# Patient Record
Sex: MALE | Race: WHITE | NOT HISPANIC OR LATINO | Employment: FULL TIME | URBAN - METROPOLITAN AREA
[De-identification: names, ages, dates, MRNs, and addresses within clinical notes are randomized per-mention and may not be internally consistent; named-entity substitution may affect disease eponyms.]

---

## 2021-01-15 ENCOUNTER — PATIENT MESSAGE (OUTPATIENT)
Dept: HEMATOLOGY/ONCOLOGY | Facility: CLINIC | Age: 61
End: 2021-01-15

## 2021-01-15 ENCOUNTER — OFFICE VISIT (OUTPATIENT)
Dept: HEMATOLOGY/ONCOLOGY | Facility: CLINIC | Age: 61
End: 2021-01-15
Payer: COMMERCIAL

## 2021-01-15 DIAGNOSIS — Z71.83 ENCOUNTER FOR NONPROCREATIVE GENETIC COUNSELING: Primary | ICD-10-CM

## 2021-01-15 PROCEDURE — 99205 OFFICE O/P NEW HI 60 MIN: CPT | Mod: ,,, | Performed by: NURSE PRACTITIONER

## 2021-01-15 PROCEDURE — 99205 PR OFFICE/OUTPT VISIT, NEW, LEVL V, 60-74 MIN: ICD-10-PCS | Mod: ,,, | Performed by: NURSE PRACTITIONER

## 2021-02-19 ENCOUNTER — PATIENT MESSAGE (OUTPATIENT)
Dept: HEMATOLOGY/ONCOLOGY | Facility: CLINIC | Age: 61
End: 2021-02-19

## 2021-02-19 DIAGNOSIS — Z80.9 FAMILY HISTORY OF CANCER: ICD-10-CM

## 2021-02-19 DIAGNOSIS — Z80.42 FAMILY HISTORY OF PROSTATE CANCER: Primary | ICD-10-CM

## 2021-04-20 ENCOUNTER — DOCUMENTATION ONLY (OUTPATIENT)
Dept: HEMATOLOGY/ONCOLOGY | Facility: CLINIC | Age: 61
End: 2021-04-20

## 2021-04-22 ENCOUNTER — TELEPHONE (OUTPATIENT)
Dept: SURGERY | Facility: CLINIC | Age: 61
End: 2021-04-22

## 2021-07-01 ENCOUNTER — PATIENT MESSAGE (OUTPATIENT)
Dept: ADMINISTRATIVE | Facility: OTHER | Age: 61
End: 2021-07-01

## 2021-07-28 ENCOUNTER — PATIENT MESSAGE (OUTPATIENT)
Dept: HEMATOLOGY/ONCOLOGY | Facility: CLINIC | Age: 61
End: 2021-07-28

## 2021-07-28 ENCOUNTER — OFFICE VISIT (OUTPATIENT)
Dept: HEMATOLOGY/ONCOLOGY | Facility: CLINIC | Age: 61
End: 2021-07-28
Payer: COMMERCIAL

## 2021-07-28 DIAGNOSIS — Z71.83 ENCOUNTER FOR NONPROCREATIVE GENETIC COUNSELING: Primary | ICD-10-CM

## 2021-07-28 PROCEDURE — 99215 OFFICE O/P EST HI 40 MIN: CPT | Mod: 95,,, | Performed by: NURSE PRACTITIONER

## 2021-07-28 PROCEDURE — 99215 PR OFFICE/OUTPT VISIT, EST, LEVL V, 40-54 MIN: ICD-10-PCS | Mod: 95,,, | Performed by: NURSE PRACTITIONER

## 2021-08-16 ENCOUNTER — TELEPHONE (OUTPATIENT)
Dept: HEMATOLOGY/ONCOLOGY | Facility: CLINIC | Age: 61
End: 2021-08-16

## 2021-08-23 ENCOUNTER — TELEPHONE (OUTPATIENT)
Dept: HEMATOLOGY/ONCOLOGY | Facility: CLINIC | Age: 61
End: 2021-08-23

## 2021-09-30 ENCOUNTER — TELEPHONE (OUTPATIENT)
Dept: HEMATOLOGY/ONCOLOGY | Facility: CLINIC | Age: 61
End: 2021-09-30

## 2021-10-29 PROBLEM — Z12.11 SCREENING FOR MALIGNANT NEOPLASM OF COLON: Status: ACTIVE | Noted: 2021-10-29

## 2022-02-15 ENCOUNTER — DOCUMENTATION ONLY (OUTPATIENT)
Dept: HEMATOLOGY/ONCOLOGY | Facility: CLINIC | Age: 62
End: 2022-02-15
Payer: COMMERCIAL

## 2022-02-15 NOTE — Clinical Note
Sis,  Please phone patient with the following: --Inform patient of AMENDED genetic testing results as detailed in attached note; and --Inform patient that a results letter has been sent to patient via [facility].  2.   Please document your call and CC referring provider and me.  3.   Lastly, please ensure that the complete AMENDED results report is scanned into chart in Epic.  Thank you, Navid

## 2022-02-15 NOTE — PROGRESS NOTES
"Hereditary and High-Risk Clinic  Department of Hematology and Oncology  [facility]      Germline Testing for Hereditary Cancer Susceptibility  Note Regarding AMENDED Results      Patient Identification  Name: Timur Vivas ("Timur")  : 1960  MRN: [id number]             Notified Timur of his amended germline cancer genetic testing results via a letter sent through [employer] ([facility]).     Genetics Navigator is to phone Timur with his amended results and with notification that the above letter has been sent and is to also ensure that the complete results report is in Timur's chart in Epic.    Signed,    Navid Dowling, DNP, APRN, FNP-BC, AOCNP  Hereditary and High-Risk Clinic  Department of Hematology and Oncology  [facility]            "

## 2022-02-15 NOTE — LETTER
February 15, 2022    Timur Vivas  [address] [place] [id number]             Dear Timur,      In April of 2021, you underwent germline cancer genetic testing after meeting with me, Navid Dowling NP, with the [facility].  Below is important information pertaining to your genetic testing results.  Please read the letter at your earliest convenience, and reach out to me with any questions or concerns.       If you would like to have an in-person or a virtual appointment for post-test genetic counseling, please message me through your [facility] patient portal or call me at [phone] to schedule the appointment.     Original Cancer Genetic Testing Results     The test that you underwent was a customized hereditary cancer panel through [employer].    Among the 108 genes that were tested, 4 genetic variants were reported:  1. 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 APC c.1333C>G (p.Sfe778Xgv) heterozygous Uncertain Significance (VUS)  3. FAVIAN c.9008A>G (p.Mdx9980Oer) heterozygous Uncertain Significance (VUS)  4. ENG c.7C>T (p.Uqi6Zhs) heterozygous Uncertain Significance (VUS)     In a gene, a VUS represents a change that the genetic testing company has not seen enough times to make a determination as to whether it is benign (harmless) or pathogenic (harmful) at this time.  VUSs are quite common and are not typically acted upon clinically.     UPDATED/AMENDED Cancer Genetic Testing Results     [employer] has recently issued a new report from your same genetic testing.       This new report indicates that [employer] has changed its classification of your abovementioned ENG gene variant (#4 above) from a VUS to a likely benign variant.  [employer] commented that this change in variant classification was made as a result of re-review of the evidence in light of new variant interpretation guidelines and/or new information.        The other 3 genetic variants mentioned above remain classified as indicated above; only your ENG variant's classification has changed.  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     A copy of your genetic testing results will be in your [facility] medical record.  Your genetic testing results report has been released to you and is accessible by you through your [facility] patient portal; if you have any difficulty accessing this, please let our office know so we can mail you a hard copy.     My recommendations remain unchanged in light of this reclassification.       Please don't hesitate to reach out with any questions or concerns.     Sincerely,           Navid Dowling, STEVIE, APRN, FNP-BC, AOCNP  Nurse Practitioner, Hereditary and High-Risk Clinic  Department of Hematology and Oncology  [facility]    Phone:  [phone]

## 2022-02-18 ENCOUNTER — TELEPHONE (OUTPATIENT)
Dept: HEMATOLOGY/ONCOLOGY | Facility: CLINIC | Age: 62
End: 2022-02-18
Payer: COMMERCIAL

## 2022-02-18 NOTE — TELEPHONE ENCOUNTER
Called and reviewed the update with Timur. He voiced thanks and understanding. Separate patient update message sent to Navid.    ----- Message from Navid Dowling DNP sent at 2/15/2022 10:09 AM CST -----    Sis,    Please phone patient with the following:  --Inform patient of AMENDED genetic testing results as detailed in attached note; and  --Inform patient that a results letter has been sent to patient via [facility].    2.   Please document your call and CC referring provider and me.    3.   Lastly, please ensure that the complete AMENDED results report is scanned into chart in Epic.    Thank you,  Navid

## 2022-02-24 ENCOUNTER — TELEPHONE (OUTPATIENT)
Dept: HEMATOLOGY/ONCOLOGY | Facility: CLINIC | Age: 62
End: 2022-02-24
Payer: COMMERCIAL

## 2022-02-24 NOTE — TELEPHONE ENCOUNTER
Received notice from genetics navigator regarding pt advising her of his wife's passing when genetics navigator phoned pt.  Phoned pt to express my condolences and to offer support resources; however, he did not answer.

## 2022-05-10 ENCOUNTER — TELEPHONE (OUTPATIENT)
Dept: HEMATOLOGY/ONCOLOGY | Facility: CLINIC | Age: 62
End: 2022-05-10
Payer: COMMERCIAL

## 2022-05-10 NOTE — TELEPHONE ENCOUNTER
"----- Message from Sis Silva MA sent at 2/24/2022  2:20 PM CST -----  Regarding: FW: Returning a Missed Scheduling Call  Calling you back!      ----- Message -----  From: Homa Pacheco  Sent: 2/24/2022   2:12 PM CST  To: Nites[facility] Shoemaker Staff  Subject: Returning a Missed Scheduling Call               Contact Preference: [phone]         Patient returning phone call to: Navid Dowling       Additional Notes:  "Thank you for all that you do for our patients'       "

## 2022-05-25 ENCOUNTER — DOCUMENTATION ONLY (OUTPATIENT)
Dept: HEMATOLOGY/ONCOLOGY | Facility: CLINIC | Age: 62
End: 2022-05-25
Payer: COMMERCIAL

## 2022-05-25 DIAGNOSIS — Z14.8 GENETIC CARRIER: Chronic | ICD-10-CM

## 2022-05-25 NOTE — PROGRESS NOTES
"Cancer Genetics  Hereditary and High-Risk Clinic  Department of Hematology and Oncology  [facility]      Germline Testing for Hereditary Cancer Susceptibility  Results Note - AMENDED      Patient Identification  Name: Timur Vivas ("Timur")  : 1960  MRN: [id number]    GERMLINE CANCER GENETIC TESTING     Genetic testing laboratory:  [facility]  Test name:  Custom, 108-gene, hereditary cancer panel  Test accession #:  [id number]     Genes analyzed:  ABRAXAS1, AIP, AKT1, ALK, APC*, FAVIAN*, ATR*, AXIN2, BAP1, BARD1, BLM, BMPR1A, BRCA1, BRCA2, BRIP1, BUB1B, CASR, CDC73, CDH1, CDK4, CDKN1B, CDKN1C, CDKN2A (p14ARF), CDKN2A(v36JFM6e), CEBPA, CEP57*, CHEK2, CTNNA1, DICER1*, DIS3L2, EGFR, ENG*, EPCAM*, ERBB2*, EZH2*, FANCA, FANCC, FANCM, FH*, FLCN, GALNT12, GATA2, GEN1, GPC3*, GREM1*, HOXB13, HRAS, KIF1B*, KIT, LZTR1, MAX*, MEN1*, MET*, MITF*, MLH1*, MLH3*, MRE11, MSH2*, MSH3*, MSH6*, MUTYH, NBN, NF1*, NF2, NTHL1, PALB2, PDGFRA, PHOX2B*, PIK3CA, PMS2*, POLD1*, POLE, POT1, OBIMH9B, PTCH1, PTCH2, PTEN*, RAD50, RAD51C, RAD51D, RB1*, RECQL*, RECQL4*, RET, RINT1, RNF43, RPS20, RUNX1, SDHA*, SDHAF2, SDHB, SDHC*, SDHD, SMAD4, SMARCA4, SMARCB1, SMARCE1, STK11, SUFU, TERC, TERT, YNAH422, TP53, TSC1*, TSC2, VHL, WRN*, WT1, XRCC2    Results:  · CARRIER -- One Pathogenic variant identified in RECQL4:  · 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   Genetics Navigator to phone Timur to schedule his post-test cancer genetics visit to discuss his RECQL4 mutation in detail.        Signed,    Navid Dowling, DNP, APRN, FNP-BC, AOCNP  Hereditary and High-Risk " Clinic  Department of Hematology and Oncology  [facility]

## 2022-05-25 NOTE — Clinical Note
Sis,  Can you please call pt to schedule his RECQL4+ post-test visit?  He's already been made aware of his mutation.  Thank you, Navid

## 2022-05-27 ENCOUNTER — PATIENT MESSAGE (OUTPATIENT)
Dept: HEMATOLOGY/ONCOLOGY | Facility: CLINIC | Age: 62
End: 2022-05-27
Payer: COMMERCIAL

## 2024-05-14 ENCOUNTER — PATIENT OUTREACH (OUTPATIENT)
Dept: ADMINISTRATIVE | Facility: OTHER | Age: 64
End: 2024-05-14
Payer: COMMERCIAL

## 2024-05-14 VITALS — HEART RATE: 57 BPM | OXYGEN SATURATION: 98 % | SYSTOLIC BLOOD PRESSURE: 132 MMHG | DIASTOLIC BLOOD PRESSURE: 78 MMHG
